# Patient Record
Sex: FEMALE | Race: WHITE
[De-identification: names, ages, dates, MRNs, and addresses within clinical notes are randomized per-mention and may not be internally consistent; named-entity substitution may affect disease eponyms.]

---

## 2019-12-23 ENCOUNTER — HOSPITAL ENCOUNTER (OUTPATIENT)
Dept: HOSPITAL 63 - MAMMO | Age: 54
Discharge: HOME | End: 2019-12-23
Attending: FAMILY MEDICINE
Payer: COMMERCIAL

## 2019-12-23 DIAGNOSIS — Z12.31: Primary | ICD-10-CM

## 2019-12-23 PROCEDURE — 77063 BREAST TOMOSYNTHESIS BI: CPT

## 2019-12-23 PROCEDURE — 77067 SCR MAMMO BI INCL CAD: CPT

## 2020-01-08 NOTE — RAD
DATE: 12/23/2019



EXAM: MAMMO BERENICE SCREENING BILATERAL



HISTORY: Routine screening



COMPARISON: No prior exams are available. Prior exams



This study was interpreted with the benefit of Computerized Aided Detection

(CAD).





Breast Density: SCATTERED The breast parenchyma shows scattered fibroglandular

densities. Breast parenchyma level B.





FINDINGS: Asymmetry involving the right retroareolar region is present 3.4 cm

from the nipple on the CC image. This probably represents summation of breast

parenchyma. No definite mass, suspicious calcification clusters, or definite

distortion.  





IMPRESSION: Right anterior retroareolar asymmetry.







BI-RADS CATEGORY: 0 INCOMPLETE: NEEDS ADDITIONAL IMAGING EVALUATION AND/OR

PRIOR MAMMOGRAMS FOR COMPARISON.



RECOMMENDED FOLLOW-UP: ADD ADDITIONAL IMAGING. Spot compression imaging in the

right cc projection is recommended. Ultrasound may be needed.



PQRS compliance statement: Patient information was entered into a reminder

system with a target due date for the next mammogram.



Mammography is a sensitive method for finding small breast cancers, but it

does not detect them all and is not a substitute for careful clinical

examination.  A negative mammogram does not negate a clinically suspicious

finding and should not result in delay in biopsying a clinically suspicious

abnormality.



"Our facility is accredited by the [employer] Mammography

Program."

## 2020-02-13 ENCOUNTER — HOSPITAL ENCOUNTER (OUTPATIENT)
Dept: HOSPITAL 63 - MAMMO | Age: 55
Discharge: HOME | End: 2020-02-13
Attending: PHYSICIAN ASSISTANT
Payer: COMMERCIAL

## 2020-02-13 DIAGNOSIS — R92.8: Primary | ICD-10-CM

## 2020-02-13 PROCEDURE — 77065 DX MAMMO INCL CAD UNI: CPT

## 2020-02-13 NOTE — RAD
DATE: February 13, 2020



EXAM: DIGITAL DIAGNOSTIC RT



HISTORY: Further evaluation of right breast asymmetry seen in the CC

projection.



COMPARISON: New baseline study dated December 23, 2019.



This study was interpreted with the benefit of Computerized Aided Detection

(CAD).







FINDINGS:

Focal digital compression 2-D mammogram of the central aspect of the right

breast was performed in the CC projection. The previously seen asymmetry

compresses away consistent with a benign finding.







IMPRESSION: Benign finding of the right breast. Recommend routine screening

mammography in one year.







BI-RADS CATEGORY: 2 BENIGN FINDING



RECOMMENDED FOLLOW-UP: 12M 12 MONTH FOLLOW-UP



PQRS compliance statement: Patient information was entered into a reminder

system with a target due date December 24, 2020 for the next mammogram.



Mammography is a sensitive method for finding small breast cancers, but it

does not detect them all and is not a substitute for careful clinical

examination.  A negative mammogram does not negate a clinically suspicious

finding and should not result in delay in biopsying a clinically suspicious

abnormality.



"Our facility is accredited by the [employer] Mammography

Program."